# Patient Record
Sex: FEMALE | Race: WHITE
[De-identification: names, ages, dates, MRNs, and addresses within clinical notes are randomized per-mention and may not be internally consistent; named-entity substitution may affect disease eponyms.]

---

## 2023-07-05 ENCOUNTER — HOSPITAL ENCOUNTER
Dept: HOSPITAL 101 - RAD | Age: 58
Discharge: HOME | End: 2023-07-05
Payer: COMMERCIAL

## 2023-07-05 DIAGNOSIS — M79.671: Primary | ICD-10-CM

## 2023-07-05 PROCEDURE — 73630 X-RAY EXAM OF FOOT: CPT

## 2023-07-19 ENCOUNTER — HOSPITAL ENCOUNTER
Dept: HOSPITAL 101 - CT | Age: 58
Discharge: HOME | End: 2023-07-19
Payer: COMMERCIAL

## 2023-07-19 DIAGNOSIS — M19.071: ICD-10-CM

## 2023-07-19 DIAGNOSIS — M87.071: Primary | ICD-10-CM

## 2023-07-19 PROCEDURE — 73700 CT LOWER EXTREMITY W/O DYE: CPT

## 2023-10-27 PROBLEM — E11.9 DIABETES (MULTI): Status: ACTIVE | Noted: 2023-10-27

## 2023-10-27 PROBLEM — Z41.9 SURGERY, ELECTIVE: Status: ACTIVE | Noted: 2023-10-27

## 2023-10-27 PROBLEM — M87.00 AVASCULAR NECROSIS (MULTI): Status: ACTIVE | Noted: 2023-10-27

## 2023-10-27 PROBLEM — M75.122 NONTRAUMATIC COMPLETE TEAR OF LEFT ROTATOR CUFF: Status: ACTIVE | Noted: 2023-10-27

## 2023-10-27 PROBLEM — K63.9 BOWEL TROUBLE: Status: ACTIVE | Noted: 2023-10-27

## 2023-10-27 PROBLEM — N32.9 BLADDER TROUBLES: Status: ACTIVE | Noted: 2023-10-27

## 2023-10-27 PROBLEM — H57.9 EYE PROBLEMS: Status: ACTIVE | Noted: 2023-10-27

## 2023-10-27 RX ORDER — CITALOPRAM 20 MG/1
TABLET, FILM COATED ORAL
COMMUNITY
Start: 2021-08-11

## 2023-10-27 RX ORDER — OXYCODONE AND ACETAMINOPHEN 5; 325 MG/1; MG/1
1 TABLET ORAL EVERY 6 HOURS
COMMUNITY
Start: 2021-05-26

## 2023-10-30 ENCOUNTER — OFFICE VISIT (OUTPATIENT)
Dept: ORTHOPEDIC SURGERY | Facility: CLINIC | Age: 58
End: 2023-10-30
Payer: COMMERCIAL

## 2023-10-30 ENCOUNTER — ANCILLARY PROCEDURE (OUTPATIENT)
Dept: RADIOLOGY | Facility: CLINIC | Age: 58
End: 2023-10-30
Payer: COMMERCIAL

## 2023-10-30 DIAGNOSIS — Z98.890 HISTORY OF REPAIR OF RIGHT ROTATOR CUFF: Primary | ICD-10-CM

## 2023-10-30 DIAGNOSIS — M25.511 RIGHT SHOULDER PAIN, UNSPECIFIED CHRONICITY: ICD-10-CM

## 2023-10-30 PROCEDURE — 73030 X-RAY EXAM OF SHOULDER: CPT | Mod: RT,FY

## 2023-10-30 PROCEDURE — 20610 DRAIN/INJ JOINT/BURSA W/O US: CPT | Performed by: ORTHOPAEDIC SURGERY

## 2023-10-30 PROCEDURE — 99214 OFFICE O/P EST MOD 30 MIN: CPT | Mod: 25 | Performed by: ORTHOPAEDIC SURGERY

## 2023-10-30 PROCEDURE — 99214 OFFICE O/P EST MOD 30 MIN: CPT | Performed by: ORTHOPAEDIC SURGERY

## 2023-10-30 PROCEDURE — 73030 X-RAY EXAM OF SHOULDER: CPT | Mod: RIGHT SIDE | Performed by: ORTHOPAEDIC SURGERY

## 2023-10-30 PROCEDURE — 1036F TOBACCO NON-USER: CPT | Performed by: ORTHOPAEDIC SURGERY

## 2023-10-30 PROCEDURE — 2500000004 HC RX 250 GENERAL PHARMACY W/ HCPCS (ALT 636 FOR OP/ED): Performed by: ORTHOPAEDIC SURGERY

## 2023-10-30 PROCEDURE — 2500000005 HC RX 250 GENERAL PHARMACY W/O HCPCS: Performed by: ORTHOPAEDIC SURGERY

## 2023-10-30 RX ORDER — BETAMETHASONE SODIUM PHOSPHATE AND BETAMETHASONE ACETATE 3; 3 MG/ML; MG/ML
12 INJECTION, SUSPENSION INTRA-ARTICULAR; INTRALESIONAL; INTRAMUSCULAR; SOFT TISSUE ONCE
Status: COMPLETED | OUTPATIENT
Start: 2023-10-30 | End: 2023-10-30

## 2023-10-30 RX ORDER — LIDOCAINE HYDROCHLORIDE 10 MG/ML
5 INJECTION INFILTRATION; PERINEURAL ONCE
Status: COMPLETED | OUTPATIENT
Start: 2023-10-30 | End: 2023-10-30

## 2023-10-30 RX ORDER — SEMAGLUTIDE 1.34 MG/ML
0.25 INJECTION, SOLUTION SUBCUTANEOUS
COMMUNITY

## 2023-10-30 RX ADMIN — LIDOCAINE HYDROCHLORIDE 5 ML: 10 INJECTION, SOLUTION INFILTRATION; PERINEURAL at 10:20

## 2023-10-30 RX ADMIN — BETAMETHASONE SODIUM PHOSPHATE AND BETAMETHASONE ACETATE 12 MG: 3; 3 INJECTION, SUSPENSION INTRA-ARTICULAR; INTRALESIONAL; INTRAMUSCULAR at 10:20

## 2023-10-30 NOTE — PROGRESS NOTES
History of present: Patient seen evaluation of her right shoulder we took care of her left shoulder about 2 years ago had to do a revision repair bicep tenodesis she has a lot of arthritis    Right shoulder had a rotator cuff repair 6 7 years ago by Dr. Yoon in [place] she is got some mild sclerotic change over the tuberosity obviously concern would be for cuff free tear            Past medical history:    The patient's past medical history, family history, social history and review of systems were documented on the patient's medical intake form.  The medical intake form was reviewed and scanned into the electronic medical record for future use.  History is otherwise negative except as stated in the history of present illness.    Physical exam:    General: Alert and oriented to person place and time.  No acute distress and breathing comfortably, pleasant and cooperative with examination.    Head and neck exam: Head is normocephalic atraumatic.    Neck: Supple no visible swelling or deformity.    Cardiovascular: Good perfusion to affected extremities without signs or symptoms of chest pain.    Lungs no audible wheezing or labored breathing on examination.    Abdominal exam: Nondistended nontender    Extremities: The right shoulder was inspected and was found to have no obvious deformity.  There was tenderness to touch over the lateral edges of the shoulder over the rotator cuff insertion.  Active forward flexion 120 degrees, external rotation to 60 degrees, abduction to 50 degrees, and internal rotation to the level of L2.    At this time the shoulder is neurovascular tact and neurosensory intact.  Motor intact C5-T1.  There was no obvious neck pain or radiculopathy noted.  There was no gross instability or adhesive capsulitis symptoms.  There was no evidence of apprehension or apprehension suppression.    Strength was tested in 4 planes with weakness in the supraspinatus strength testing and external rotation  position.  There was no strength deficit in internal rotation.  Impingement signs were positive both supine and standing for impingement test type I and II.  There was mild pain over the bicipital groove with a positive speeds sign    Before aspiration injection the benefits of a cortisone injection including infection, local skin irritation, skin atrophy, calcification, continued pain and discomfort, elevated blood sugar, burning, failure to relieve pain, possible late infection were discussed with the patient.    Postprocedure discomfort can be alleviated with additional medications, ice, elevation, rest over the first 24 hours as recommended.    Patient verbalized understanding and wanted to proceed with the planned procedure.    After informed consent was provided and allergies verified, the patient was positioned appropriately on thel bed.  The right shoulder to be aspirated and injected was prepped and draped in a sterile fashion.  The skin was anesthetized with ethyl chloride spray.  A joint aspiration was to be performed an 18-gauge needle was used otherwise a 22-gauge needle was used to inject the appropriate joint.    Joint injection was performed with a mixture of 5 cc 1% lidocaine plain and 2 cc Celestone Soluspan 6 mg per mL.  The needle was removed and the puncture site closed and sealed with a Band-Aid.  The patient tolerated the procedure well.        Diagnostic studies: Right shoulder x-ray shows a metallic anchor in the proximal humerus slight elevation possibly the humeral head little sclerotic change around the humeral head no fracture dislocation      Impression: Right shoulder history of rotator cuff repair now with mild secondary sclerotic change over the tuberosity possible chronic tendinitis and/or root tear      Plan: Cortisone injection therapy exercise conditioning program we will see her back 5 6 weeks if she is doing good she can hold off on treatment if her pain pattern increases  obviously arthrogram MRI would be mandatory to evaluate for any integrity of remaining rotator cuff

## 2023-11-27 ENCOUNTER — HOSPITAL ENCOUNTER
Dept: HOSPITAL 101 - LAB | Age: 58
Discharge: HOME | End: 2023-11-27
Payer: COMMERCIAL

## 2023-11-27 DIAGNOSIS — E66.01: ICD-10-CM

## 2023-11-27 DIAGNOSIS — E78.5: ICD-10-CM

## 2023-11-27 DIAGNOSIS — R53.83: Primary | ICD-10-CM

## 2023-11-27 DIAGNOSIS — E11.9: ICD-10-CM

## 2023-11-27 LAB
ALANINE AMINOTRANSFERASE: 40 U/L (ref 14–59)
ALBUMIN GLOBULIN RATIO: 0.9
ALBUMIN LEVEL: 3.7 G/DL (ref 3.4–5)
ALKALINE PHOSPHATASE: 109 U/L (ref 46–116)
ANION GAP: 13.4
ASPARTATE AMINO TRANSFERASE: 27 U/L (ref 15–37)
BLOOD UREA NITROGEN: 9 MG/DL (ref 7–18)
CALCIUM: 8.9 MG/DL (ref 8.5–10.1)
CARBON DIOXIDE: 30.5 MMOL/L (ref 21–32)
CHLORIDE: 102 MMOL/L (ref 98–107)
CO2 BLD-SCNC: 30.5 MMOL/L (ref 21–32)
ESTIMATED GFR (AFRICAN AMERICA: >60 (ref 60–?)
ESTIMATED GFR (NON-AFRICAN AME: >60 (ref 60–?)
GLOBULIN: 3.9 G/DL
GLUCOSE BLD-MCNC: 95 MG/DL (ref 74–106)
POTASSIUM SERPLBLD-SCNC: 3.9 MMOL/L (ref 3.5–5.1)
POTASSIUM: 3.9 MMOL/L (ref 3.5–5.1)
SODIUM BLD-SCNC: 142 MMOL/L (ref 136–145)
SODIUM: 142 MMOL/L (ref 136–145)
THYROID STIMULATING HORMONE: 2.66 UIU/ML (ref 0.36–3.74)
TOTAL PROTEIN: 7.6 G/DL (ref 6.4–8.2)
VITAMIN B12: 342 PG/ML (ref 193–986)

## 2023-11-27 PROCEDURE — 86376 MICROSOMAL ANTIBODY EACH: CPT

## 2023-11-27 PROCEDURE — 84443 ASSAY THYROID STIM HORMONE: CPT

## 2023-11-27 PROCEDURE — 82306 VITAMIN D 25 HYDROXY: CPT

## 2023-11-27 PROCEDURE — 80053 COMPREHEN METABOLIC PANEL: CPT

## 2023-11-27 PROCEDURE — 83036 HEMOGLOBIN GLYCOSYLATED A1C: CPT

## 2023-11-27 PROCEDURE — 36415 COLL VENOUS BLD VENIPUNCTURE: CPT

## 2023-11-27 PROCEDURE — 86800 THYROGLOBULIN ANTIBODY: CPT

## 2023-11-27 PROCEDURE — 84439 ASSAY OF FREE THYROXINE: CPT

## 2023-11-27 PROCEDURE — 82607 VITAMIN B-12: CPT

## 2023-12-04 ENCOUNTER — APPOINTMENT (OUTPATIENT)
Dept: ORTHOPEDIC SURGERY | Facility: CLINIC | Age: 58
End: 2023-12-04
Payer: COMMERCIAL

## 2023-12-04 ENCOUNTER — OFFICE VISIT (OUTPATIENT)
Dept: ORTHOPEDIC SURGERY | Facility: CLINIC | Age: 58
End: 2023-12-04
Payer: COMMERCIAL

## 2023-12-04 DIAGNOSIS — Z98.890 HISTORY OF REPAIR OF RIGHT ROTATOR CUFF: Primary | ICD-10-CM

## 2023-12-04 PROCEDURE — 1036F TOBACCO NON-USER: CPT | Performed by: ORTHOPAEDIC SURGERY

## 2023-12-04 PROCEDURE — 99213 OFFICE O/P EST LOW 20 MIN: CPT | Performed by: ORTHOPAEDIC SURGERY

## 2023-12-04 NOTE — PROGRESS NOTES
History of present illness: Patient with a history of right rotator cuff repair 5 6 years ago by Dr. Cabrera    She had a combination of neck and shoulder pain    We gave her a diagnostic cortisone shot it gave her really no relief    She has been in therapy for 6 weeks    All the pain seems to be cervical spine in nature with flexion rotation and abduction she gets total relief if she abducts her shoulder and extends her head and puts a pillow behind her scapula    It does not appear to be rotator cuff dysfunction at this time    We offered a follow-up gadolinium arthrogram study of the right shoulder but she feels between the negative response to the shot and the therapy that this is all cervical and we will refer her to our cervical spine team    Physical exam:    General: No acute distress or breathing difficulty or discomfort, pleasant and cooperative with the examination.    Extremities: Right neck pain relieved with flexion extension exercises    Patient has really no pain or strength deficit of the shoulder on exam today    The pain goes down the arm all the way to the radial side digits    She is motor intact C5-T1    There is no gross instability    There is no adhesions    She can flex up to 160 abduct to 70 in fact her pain is relieved when she puts a cushion or pillow behind the scapula    There is no obvious winging    Old incisions are clean healed dry and intact    Diagnostic studies: No additional imaging studies are needed at this time we will not order a shoulder MRI as we will refer her to our cervical team    Impression: Probable cervical spine stenosis or cervical radiculopathy    Plan: Hold off on imaging right shoulder we will see her back in our office as needed she will follow-up with our back team to get her cervical spine evaluation completed

## 2023-12-11 ENCOUNTER — HOSPITAL ENCOUNTER
Dept: HOSPITAL 101 - MAMMO | Age: 58
Discharge: HOME | End: 2023-12-11
Payer: COMMERCIAL

## 2023-12-11 DIAGNOSIS — Z80.42: ICD-10-CM

## 2023-12-11 DIAGNOSIS — Z12.31: Primary | ICD-10-CM

## 2023-12-11 DIAGNOSIS — Z80.8: ICD-10-CM

## 2023-12-11 PROCEDURE — 77067 SCR MAMMO BI INCL CAD: CPT

## 2023-12-11 PROCEDURE — 77063 BREAST TOMOSYNTHESIS BI: CPT

## 2023-12-20 ENCOUNTER — APPOINTMENT (OUTPATIENT)
Dept: ORTHOPEDIC SURGERY | Facility: CLINIC | Age: 58
End: 2023-12-20
Payer: COMMERCIAL

## 2023-12-26 ENCOUNTER — OFFICE VISIT (OUTPATIENT)
Dept: ORTHOPEDIC SURGERY | Facility: CLINIC | Age: 58
End: 2023-12-26
Payer: COMMERCIAL

## 2023-12-26 ENCOUNTER — ANCILLARY PROCEDURE (OUTPATIENT)
Dept: RADIOLOGY | Facility: CLINIC | Age: 58
End: 2023-12-26
Payer: COMMERCIAL

## 2023-12-26 DIAGNOSIS — M54.12 CERVICAL RADICULOPATHY: Primary | ICD-10-CM

## 2023-12-26 DIAGNOSIS — M54.2 NECK PAIN: ICD-10-CM

## 2023-12-26 DIAGNOSIS — M54.12 CERVICAL RADICULOPATHY: ICD-10-CM

## 2023-12-26 PROCEDURE — 99214 OFFICE O/P EST MOD 30 MIN: CPT | Performed by: ORTHOPAEDIC SURGERY

## 2023-12-26 PROCEDURE — 72050 X-RAY EXAM NECK SPINE 4/5VWS: CPT | Performed by: ORTHOPAEDIC SURGERY

## 2023-12-26 PROCEDURE — 1036F TOBACCO NON-USER: CPT | Performed by: ORTHOPAEDIC SURGERY

## 2023-12-26 PROCEDURE — 72050 X-RAY EXAM NECK SPINE 4/5VWS: CPT

## 2023-12-26 NOTE — PROGRESS NOTES
Established patient to the practice new patient to us.  She had rotator cuff surgery with Dr. Pancho Moise and thought she was having some shoulder issues and came in to see him.  She was getting some neck and right sided burning into her bicep.  And then started going down her arm.  She had an injection with him.  It did nothing.  She had physical therapy for her shoulder and then they started doing stuff on her neck.  Which seemed to help slightly.  If she takes a pill (between her shoulder blades it, relieves her symptoms.  She denies bowel or bladder complaints, denies saddle anesthesia denies fever chills nausea vomiting night sweats.  Denies gait or balance changes.  She denies any trauma accidents or falls to cause it.  Denies any spine surgery.    Physical exam: Well-nourished, well kept.  No lymphangitis or lymphadenopathy in the examined extremities.  Good perfusion to the extremities ×4.  Radial and dorsalis pedis pulses 2+.  Capillary refill to all 4 digits brisk.  No distal edema x 4.  Gait normal.  Can walk on heels and toes.  There are some decreased range of motion flexion-extension rotation cervical spine mildly tender in the right cervical spinal musculature good strength no instability.  Mild Spurling's back into the right.  Examination of the back reveals no swelling, step-off, or point tenderness.  Range of motion with flexion, extension, side bending and rotation is well maintained without crepitance, instability, or exacerbation of pain.  Strength is within normal limits.  Examination of the upper extremities reveals no point tenderness, swelling, or deformity.  Range of motion of the shoulders, elbows, wrists, and fingers are all full without crepitance, instability, or exacerbation of pain.  Strength is 5/5 throughout.  Examination of the lower extremities reveals no point tenderness, swelling, or deformity.  Range of motion of the hips, knees, and ankles are full without crepitance,  instability, or exacerbation of pain.  Strength is 5/5 throughout.  No redness, abrasions, or lesions on all 4 extremities, head and neck, or trunk.  Gross sensation intact in the extremities ×4.  Deep tendon reflexes 2+ and symmetric bilaterally.  Vasiliy, clonus, and Babinski were negative.  Straight leg raise negative.  Affect normal.  Alert and oriented ×3.  Coordination normal.    X-ray: X-rays cervical spine AP lateral flexion-extension taken today show straightening of the cervical spinal musculature.  Some arthritic degenerative changes cervical 6 7 appears to be the worst no obvious fractures dislocations or bony lytic lesions were noted.    Assessment: This a patient which appears to have a cervical radiculopathy.  That needs further treatment.  She has had physical therapy finished that.  For her shoulder and her neck she had home handouts which she was given that she is continuing with those.  They are not helping.  She had over-the-counter medications.  She had a steroid injection in her shoulder which did not help.    Plan: Will submit for a cervical MRI.  She lives in [place] which is near [place].  So hopefully we can set that up where she comes and has the MRI and may be follows up with us after that MRI on the same trip.  Please refer to Dr. Martins's note for final plan.  MRI will be for diagnostic purposes for either epidural injections or surgical invention which we talked her about all the risks and benefits with.    In a face-to-face encounter, I performed a history and physical examination, discussed pertinent diagnostic studies if indicated, and discussed diagnosis and management strategies with both the patient and the midlevel provider.  I reviewed the midlevel's note and agree with the documented findings and plan of care.    Right arm radiculopathy that is been going on for quite some time now.  It was all the way down to her thumb but now it is mainly the shoulder and arm and stops  around the elbow.  She has had a right rotator cuff repair and sees Dr. Pancho Moise for that.  However, it does not appear to be her rotator cuff as shoulder range of motion today does not reproduce any of her symptoms.  X-rays look pretty good on her today.  She has a negative Spurling's.  She has maybe a little bit of weakness of her right biceps but is hard to say if it is coming from her shoulder from her neck.  Organ to get an MRI of her cervical spine and see if it shows anything on the right side that would explain these right arm radicular type symptoms.  Will give her Valium prescription management for the MRI.

## 2023-12-30 RX ORDER — DIAZEPAM 10 MG/1
5 TABLET ORAL EVERY 8 HOURS PRN
Qty: 3 TABLET | Refills: 0 | Status: SHIPPED | OUTPATIENT
Start: 2023-12-30 | End: 2024-01-02

## 2024-01-09 DIAGNOSIS — M54.2 NECK PAIN: ICD-10-CM

## 2024-01-09 DIAGNOSIS — M54.12 CERVICAL RADICULOPATHY: ICD-10-CM

## 2024-01-17 ENCOUNTER — TELEPHONE (OUTPATIENT)
Dept: ORTHOPEDIC SURGERY | Facility: CLINIC | Age: 59
End: 2024-01-17
Payer: COMMERCIAL

## 2024-01-17 DIAGNOSIS — M54.12 CERVICAL RADICULOPATHY: ICD-10-CM

## 2024-01-17 DIAGNOSIS — M54.2 NECK PAIN: ICD-10-CM

## 2024-01-17 NOTE — TELEPHONE ENCOUNTER
Patient lm stating an MRI was ordered and she was prescribed Valium to help with the anxiety.  She said she is being told she needs to be sedated for the MRI because of the amount of anxiety she gets during the procedure.  She states she will need to have a new order with these directions for sedation.  She can be reached at [phone] if there are any questions.

## 2024-04-01 ENCOUNTER — HOSPITAL ENCOUNTER (OUTPATIENT)
Dept: HOSPITAL 101 - LAB | Age: 59
Discharge: HOME | End: 2024-04-01
Payer: COMMERCIAL

## 2024-04-01 DIAGNOSIS — Z01.419: Primary | ICD-10-CM

## 2024-04-01 PROCEDURE — G0145 SCR C/V CYTO,THINLAYER,RESCR: HCPCS

## 2024-04-01 PROCEDURE — 87624 HPV HI-RISK TYP POOLED RSLT: CPT

## 2024-04-08 ENCOUNTER — APPOINTMENT (OUTPATIENT)
Dept: ORTHOPEDIC SURGERY | Facility: CLINIC | Age: 59
End: 2024-04-08
Payer: COMMERCIAL

## 2024-10-29 ENCOUNTER — HOSPITAL ENCOUNTER
Dept: HOSPITAL 101 - RAD | Age: 59
Discharge: HOME | End: 2024-10-29
Payer: COMMERCIAL

## 2024-10-29 DIAGNOSIS — Z98.890: ICD-10-CM

## 2024-10-29 DIAGNOSIS — Z96.661: ICD-10-CM

## 2024-10-29 DIAGNOSIS — M25.571: Primary | ICD-10-CM

## 2024-10-29 PROCEDURE — 73610 X-RAY EXAM OF ANKLE: CPT

## 2024-12-12 ENCOUNTER — HOSPITAL ENCOUNTER
Dept: HOSPITAL 101 - RAD | Age: 59
Discharge: HOME | End: 2024-12-12
Payer: COMMERCIAL

## 2024-12-12 DIAGNOSIS — M47.816: ICD-10-CM

## 2024-12-12 DIAGNOSIS — M43.10: ICD-10-CM

## 2024-12-12 DIAGNOSIS — Z80.8: ICD-10-CM

## 2024-12-12 DIAGNOSIS — Z12.31: ICD-10-CM

## 2024-12-12 DIAGNOSIS — M54.16: Primary | ICD-10-CM

## 2024-12-12 DIAGNOSIS — Z80.42: ICD-10-CM

## 2024-12-12 PROCEDURE — 77063 BREAST TOMOSYNTHESIS BI: CPT

## 2024-12-12 PROCEDURE — 72100 X-RAY EXAM L-S SPINE 2/3 VWS: CPT

## 2024-12-12 PROCEDURE — 77067 SCR MAMMO BI INCL CAD: CPT

## 2025-04-07 ENCOUNTER — HOSPITAL ENCOUNTER (OUTPATIENT)
Dept: HOSPITAL 101 - LAB | Age: 60
Discharge: HOME | End: 2025-04-07
Payer: MEDICARE

## 2025-04-07 DIAGNOSIS — Z01.419: Primary | ICD-10-CM

## 2025-04-07 PROCEDURE — 88175 CYTOPATH C/V AUTO FLUID REDO: CPT

## 2025-04-07 PROCEDURE — 87624 HPV HI-RISK TYP POOLED RSLT: CPT

## 2025-04-28 ENCOUNTER — APPOINTMENT (OUTPATIENT)
Dept: ORTHOPEDIC SURGERY | Facility: CLINIC | Age: 60
End: 2025-04-28
Payer: MEDICARE

## 2025-04-28 ENCOUNTER — HOSPITAL ENCOUNTER (OUTPATIENT)
Dept: RADIOLOGY | Facility: HOSPITAL | Age: 60
Discharge: HOME | End: 2025-04-28
Payer: MEDICARE

## 2025-04-28 DIAGNOSIS — M25.511 ACUTE PAIN OF RIGHT SHOULDER: ICD-10-CM

## 2025-04-28 DIAGNOSIS — M25.512 ACUTE PAIN OF LEFT SHOULDER: ICD-10-CM

## 2025-04-28 PROCEDURE — 73030 X-RAY EXAM OF SHOULDER: CPT | Mod: BILATERAL PROCEDURE | Performed by: RADIOLOGY

## 2025-04-28 PROCEDURE — 99213 OFFICE O/P EST LOW 20 MIN: CPT | Performed by: ORTHOPAEDIC SURGERY

## 2025-04-28 PROCEDURE — 73030 X-RAY EXAM OF SHOULDER: CPT | Mod: 50

## 2025-04-28 NOTE — PROGRESS NOTES
History of present illness: Patient with a history of right rotator cuff surgery done elsewhere with apparent rotator cuff deficiency    Patient had a left revision rotator cuff repair done by myself on 6/1/2021    At that time she had a bicep tendon stump that had ruptured and was debrided extensively had a revision rotator cuff repair\.        Physical exam:    General: No acute distress or breathing difficulty or discomfort, pleasant and cooperative with the examination.    Extremities: The right shoulder was inspected and was found to have no obvious deformity.  There was tenderness to touch over the lateral edges of the shoulder over the rotator cuff insertion.  Active forward flexion 110 degrees, external rotation to 50 degrees, abduction to 45 degrees, and internal rotation to the level of L2.    At this time the shoulder is neurovascular tact and neurosensory intact.  Motor intact C5-T1.  There was no obvious neck pain or radiculopathy noted.  There was no gross instability or adhesive capsulitis symptoms.  There was no evidence of apprehension or apprehension suppression.    Strength was tested in 4 planes with weakness in the supraspinatus strength testing and external rotation position.  There was no strength deficit in internal rotation.  Impingement signs were positive both supine and standing for impingement test type I and II.  There was mild pain over the bicipital groove with a positive speeds sign    Left shoulder is also examined    The left shoulder moves quite a bit better than the right shoulder.  She can flex up to about 150 abduct to 60 external rotate to 50 reasonable push pull mild impingement when compared to the right.    Negative speeds sign there is a little bit of cramping in the bicep        Diagnostic studies: AP axillary both shoulder show sclerotic change over the greater tuberosity more severe over the right than left shoulder metallic anchor in the right shoulder with downsloping  acromion slight compromise of the subacromial space on the right when compared to the left    Impression: Right shoulder probable cuff deficiency with secondary sclerotic changes over the greater tuberosity    Left shoulder revision rotator cuff repair with mild sclerotic and wear pattern now changes over the greater tuberosity as well there is a metallic anchor in the right shoulder but not the left    Plan: Recommend continue gentle range of motion program    1 pound push pull permanent limit below shoulder only    See her back occasionally for evaluation and assessment down the road should her shoulders deteriorate we would talk about imaging studies MRI and possible reverse total shoulder replacement